# Patient Record
(demographics unavailable — no encounter records)

---

## 2024-11-08 NOTE — REVIEW OF SYSTEMS
[Fatigue] : fatigue [Nasal Congestion] : nasal congestion [Postnasal Drip] : postnasal drip [Sinus Problems] : sinus problems [SOB on Exertion] : sob on exertion [Seasonal Allergies] : seasonal allergies [Back Pain] : back pain [Obesity] : obesity [Negative] : Neurologic

## 2024-11-08 NOTE — DISCUSSION/SUMMARY
[FreeTextEntry1] :  #1. Will schedule lung function testing in near future to assess lung function. #2. The patient does not appear to require chronic BD therapy at this time. #3. Diet and exercise for weight loss. #4. SOBOE is likely at least somewhat related to weight or deconditioning. #5. CXR to evaluate SOBOE. #6. Home PSG to evaluate for possible SARA. #7. Consider PAP therapy for significant SARA. #8. S/p Covid vaccines and boosters. #9. Flonase nasal spray for postnasal drip as needed. Zyrtec/Allegra/Claritin for allergies as needed. #10. F/u in 6-8 weeks with HST, PFTs, and CXR.   The patient expressed understanding and agreement with the above recommendations/plan and accepts responsibility to be compliant with recommended testing, therapies, and f/u visits. All relevant questions and concerns were addressed.

## 2024-11-08 NOTE — REASON FOR VISIT
[Initial] : an initial visit [Sleep Apnea] : sleep apnea [Shortness of Breath] : shortness of breath [Obesity] : obesity [TextBox_44] : smoking hx, prior Covid infections, [place] exposure

## 2024-11-08 NOTE — END OF VISIT
[Time Spent: ___ minutes] : I have spent [unfilled] minutes of time on the encounter which excludes teaching and separately reported services. [TextEntry] : Discussed with pt at length regarding SARA, obesity, soboe, prior Covid infection, [place] exposure; reviewed w/u with pt as above.

## 2024-11-08 NOTE — CONSULT LETTER
[Dear  ___] : Dear  [unfilled], [Consult Letter:] : I had the pleasure of evaluating your patient, [unfilled]. [Please see my note below.] : Please see my note below. [Consult Closing:] : Thank you very much for allowing me to participate in the care of this patient.  If you have any questions, please do not hesitate to contact me. [Sincerely,] : Sincerely, [FreeTextEntry3] : Danny Bowers MD, FCCP, D. [facility] [place]

## 2024-11-08 NOTE — HISTORY OF PRESENT ILLNESS
[Excessive Daytime Sleepiness] : excessive daytime sleepiness [Witnessed Gasping During Sleep] : witnessed gasping during sleep [Snoring] : snoring [Unrefreshing Sleep] : unrefreshing sleep [Sleepy When Sedentary] : sleepy when sedentary [Currently Experiencing] : The patient is currently experiencing symptoms. [None] : The patient is not currently being treated for this problem [Initial Evaluation] : an initial evaluation of [TextBox_4] : Former smoker 1/2 ppd x 7 years, quit 1999. S/p Covid infection in 2021 requiring hospitalization and again in 2023 but was mild. Pt reports 3 months of [place] exposure when she was in the [employer]. Admits to PNDS and allergies on Xyzal, nasal sprays and eye drops. Patient c/o SOBOE but is otherwise without associated respiratory complaints.  S/p gastric sleeve surgery with 55 lbs weight loss but gained back 20 lbs. Reports h/o SARA. [ESS] : 17 [TextBox_11] : 3

## 2024-11-08 NOTE — PHYSICAL EXAM
[No Acute Distress] : no acute distress [Normal Oropharynx] : normal oropharynx [Normal Appearance] : normal appearance [Supple] : supple [Normal Rate/Rhythm] : normal rate/rhythm [Normal S1, S2] : normal s1, s2 [No Murmurs] : no murmurs [No Resp Distress] : no resp distress [No Acc Muscle Use] : no acc muscle use [Normal Rhythm and Effort] : normal rhythm and effort [Clear to Auscultation Bilaterally] : clear to auscultation bilaterally [No Abnormalities] : no abnormalities [Benign] : benign [Not Tender] : not tender [Soft] : soft [No Clubbing] : no clubbing [No Edema] : no edema [No Focal Deficits] : no focal deficits [Oriented x3] : oriented x3